# Patient Record
Sex: MALE | Race: WHITE
[De-identification: names, ages, dates, MRNs, and addresses within clinical notes are randomized per-mention and may not be internally consistent; named-entity substitution may affect disease eponyms.]

---

## 2018-04-24 ENCOUNTER — HOSPITAL ENCOUNTER (OUTPATIENT)
Dept: HOSPITAL 42 - ED | Age: 43
Setting detail: OBSERVATION
LOS: 2 days | Discharge: HOME | End: 2018-04-26
Attending: HOSPITALIST | Admitting: INTERNAL MEDICINE
Payer: COMMERCIAL

## 2018-04-24 VITALS — BODY MASS INDEX: 40.3 KG/M2

## 2018-04-24 DIAGNOSIS — G47.30: ICD-10-CM

## 2018-04-24 DIAGNOSIS — F17.210: ICD-10-CM

## 2018-04-24 DIAGNOSIS — Z91.14: ICD-10-CM

## 2018-04-24 DIAGNOSIS — Z86.011: ICD-10-CM

## 2018-04-24 DIAGNOSIS — E78.1: ICD-10-CM

## 2018-04-24 DIAGNOSIS — Z88.8: ICD-10-CM

## 2018-04-24 DIAGNOSIS — Z82.49: ICD-10-CM

## 2018-04-24 DIAGNOSIS — E78.5: ICD-10-CM

## 2018-04-24 DIAGNOSIS — J45.909: ICD-10-CM

## 2018-04-24 DIAGNOSIS — R47.01: ICD-10-CM

## 2018-04-24 DIAGNOSIS — G45.9: Primary | ICD-10-CM

## 2018-04-24 DIAGNOSIS — R07.89: ICD-10-CM

## 2018-04-24 DIAGNOSIS — F41.0: ICD-10-CM

## 2018-04-24 DIAGNOSIS — E66.9: ICD-10-CM

## 2018-04-24 DIAGNOSIS — E78.00: ICD-10-CM

## 2018-04-24 DIAGNOSIS — I10: ICD-10-CM

## 2018-04-24 DIAGNOSIS — G43.809: ICD-10-CM

## 2018-04-24 LAB
ALBUMIN SERPL-MCNC: 4.5 G/DL (ref 3–4.8)
ALBUMIN/GLOB SERPL: 1.4 {RATIO} (ref 1.1–1.8)
ALT SERPL-CCNC: 80 U/L (ref 7–56)
APPEARANCE UR: CLEAR
APTT BLD: 36.8 SECONDS (ref 25.1–36.5)
AST SERPL-CCNC: 54 U/L (ref 17–59)
BILIRUB UR-MCNC: NEGATIVE MG/DL
BUN SERPL-MCNC: 12 MG/DL (ref 7–21)
CALCIUM SERPL-MCNC: 9.3 MG/DL (ref 8.4–10.5)
COLOR UR: YELLOW
ERYTHROCYTE [DISTWIDTH] IN BLOOD BY AUTOMATED COUNT: 12.9 % (ref 11.5–14.5)
GFR NON-AFRICAN AMERICAN: > 60
GLUCOSE UR STRIP-MCNC: NEGATIVE MG/DL
HGB BLD-MCNC: 15.4 G/DL (ref 14–18)
INR PPP: 1.07 (ref 0.93–1.08)
LEUKOCYTE ESTERASE UR-ACNC: NEGATIVE LEU/UL
MCH RBC QN AUTO: 31.6 PG (ref 25–35)
MCHC RBC AUTO-ENTMCNC: 36.3 G/DL (ref 31–37)
MCV RBC AUTO: 86.9 FL (ref 80–105)
PH UR STRIP: 6.5 [PH] (ref 4.7–8)
PLATELET # BLD: 180 10^3/UL (ref 120–450)
PMV BLD AUTO: 10.5 FL (ref 7–11)
PROT UR STRIP-MCNC: NEGATIVE MG/DL
PROTHROMBIN TIME: 12.2 SECONDS (ref 9.4–12.5)
RBC # BLD AUTO: 4.88 10^6/UL (ref 3.5–6.1)
RBC # UR STRIP: NEGATIVE /UL
SP GR UR STRIP: 1.01 (ref 1–1.03)
TROPONIN I SERPL-MCNC: < 0.01 NG/ML
UROBILINOGEN UR STRIP-ACNC: 0.2 E.U./DL
WBC # BLD AUTO: 6.6 10^3/UL (ref 4.5–11)

## 2018-04-24 PROCEDURE — 70498 CT ANGIOGRAPHY NECK: CPT

## 2018-04-24 PROCEDURE — 99285 EMERGENCY DEPT VISIT HI MDM: CPT

## 2018-04-24 PROCEDURE — 93306 TTE W/DOPPLER COMPLETE: CPT

## 2018-04-24 PROCEDURE — 70496 CT ANGIOGRAPHY HEAD: CPT

## 2018-04-24 PROCEDURE — 96365 THER/PROPH/DIAG IV INF INIT: CPT

## 2018-04-24 PROCEDURE — 83615 LACTATE (LD) (LDH) ENZYME: CPT

## 2018-04-24 PROCEDURE — 96375 TX/PRO/DX INJ NEW DRUG ADDON: CPT

## 2018-04-24 PROCEDURE — 80061 LIPID PANEL: CPT

## 2018-04-24 PROCEDURE — 93005 ELECTROCARDIOGRAM TRACING: CPT

## 2018-04-24 PROCEDURE — 85610 PROTHROMBIN TIME: CPT

## 2018-04-24 PROCEDURE — 84443 ASSAY THYROID STIM HORMONE: CPT

## 2018-04-24 PROCEDURE — 97116 GAIT TRAINING THERAPY: CPT

## 2018-04-24 PROCEDURE — 92610 EVALUATE SWALLOWING FUNCTION: CPT

## 2018-04-24 PROCEDURE — 81003 URINALYSIS AUTO W/O SCOPE: CPT

## 2018-04-24 PROCEDURE — 86900 BLOOD TYPING SEROLOGIC ABO: CPT

## 2018-04-24 PROCEDURE — 86850 RBC ANTIBODY SCREEN: CPT

## 2018-04-24 PROCEDURE — 96372 THER/PROPH/DIAG INJ SC/IM: CPT

## 2018-04-24 PROCEDURE — 36415 COLL VENOUS BLD VENIPUNCTURE: CPT

## 2018-04-24 PROCEDURE — 97161 PT EVAL LOW COMPLEX 20 MIN: CPT

## 2018-04-24 PROCEDURE — 70553 MRI BRAIN STEM W/O & W/DYE: CPT

## 2018-04-24 PROCEDURE — 84439 ASSAY OF FREE THYROXINE: CPT

## 2018-04-24 PROCEDURE — 84484 ASSAY OF TROPONIN QUANT: CPT

## 2018-04-24 PROCEDURE — 93880 EXTRACRANIAL BILAT STUDY: CPT

## 2018-04-24 PROCEDURE — 70450 CT HEAD/BRAIN W/O DYE: CPT

## 2018-04-24 PROCEDURE — 80053 COMPREHEN METABOLIC PANEL: CPT

## 2018-04-24 PROCEDURE — 85027 COMPLETE CBC AUTOMATED: CPT

## 2018-04-24 PROCEDURE — 83036 HEMOGLOBIN GLYCOSYLATED A1C: CPT

## 2018-04-24 PROCEDURE — 85025 COMPLETE CBC W/AUTO DIFF WBC: CPT

## 2018-04-24 PROCEDURE — 82550 ASSAY OF CK (CPK): CPT

## 2018-04-24 PROCEDURE — 85730 THROMBOPLASTIN TIME PARTIAL: CPT

## 2018-04-25 VITALS — RESPIRATION RATE: 20 BRPM

## 2018-04-25 LAB
ALBUMIN SERPL-MCNC: 4.6 G/DL (ref 3–4.8)
ALBUMIN/GLOB SERPL: 1.5 {RATIO} (ref 1.1–1.8)
ALT SERPL-CCNC: 78 U/L (ref 7–56)
AST SERPL-CCNC: 48 U/L (ref 17–59)
BASOPHILS # BLD AUTO: 0.02 K/MM3 (ref 0–2)
BASOPHILS NFR BLD: 0.2 % (ref 0–3)
BUN SERPL-MCNC: 12 MG/DL (ref 7–21)
CALCIUM SERPL-MCNC: 10.4 MG/DL (ref 8.4–10.5)
EOSINOPHIL # BLD: 0.2 10*3/UL (ref 0–0.7)
EOSINOPHIL NFR BLD: 2.3 % (ref 1.5–5)
ERYTHROCYTE [DISTWIDTH] IN BLOOD BY AUTOMATED COUNT: 13.1 % (ref 11.5–14.5)
GFR NON-AFRICAN AMERICAN: > 60
GRANULOCYTES # BLD: 5.88 10*3/UL (ref 1.4–6.5)
GRANULOCYTES NFR BLD: 62.2 % (ref 50–68)
HDLC SERPL-MCNC: 31 MG/DL (ref 29–60)
HGB BLD-MCNC: 16 G/DL (ref 14–18)
LDLC SERPL-MCNC: 138 MG/DL (ref 0–129)
LYMPHOCYTES # BLD: 2.7 10*3/UL (ref 1.2–3.4)
LYMPHOCYTES NFR BLD AUTO: 28.4 % (ref 22–35)
MCH RBC QN AUTO: 31.4 PG (ref 25–35)
MCHC RBC AUTO-ENTMCNC: 35.9 G/DL (ref 31–37)
MCV RBC AUTO: 87.6 FL (ref 80–105)
MONOCYTES # BLD AUTO: 0.7 10*3/UL (ref 0.1–0.6)
MONOCYTES NFR BLD: 6.9 % (ref 1–6)
PLATELET # BLD: 203 10^3/UL (ref 120–450)
PMV BLD AUTO: 10.4 FL (ref 7–11)
RBC # BLD AUTO: 5.09 10^6/UL (ref 3.5–6.1)
T4 FREE SERPL-MCNC: 1.16 NG/DL (ref 0.78–2.19)
WBC # BLD AUTO: 9.5 10^3/UL (ref 4.5–11)

## 2018-04-25 RX ADMIN — ENOXAPARIN SODIUM SCH MG: 40 INJECTION SUBCUTANEOUS at 10:53

## 2018-04-25 RX ADMIN — PANTOPRAZOLE SODIUM SCH MG: 40 TABLET, DELAYED RELEASE ORAL at 06:47

## 2018-04-25 NOTE — CARD
--------------- APPROVED REPORT --------------





EKG Measurement

Heart [id number]

VT [id number]

[id number]

[id number]

[id number]



<Conclusion>

Sinus tachycardia

Possible Left atrial enlargement

## 2018-04-25 NOTE — CT
PROCEDURE:  CT HEAD WITHOUT CONTRAST.



HISTORY:

WEAKNESS/POSSIBLE CVA



COMPARISON:

11/20/14 



TECHNIQUE:

Axial computed tomography images were obtained through the head/brain 

without intravenous contrast.  



Radiation dose:



Total exam DLP =  mGy-cm.



This CT exam was performed using one or more of the following dose 

reduction techniques: Automated exposure control, adjustment of the 

mA and/or kV according to patient size, and/or use of iterative 

reconstruction technique.



FINDINGS:



HEMORRHAGE:

No intracranial hemorrhage. 



BRAIN:

No mass effect or edema.  No atrophy or chronic microvascular 

ischemic changes.



VENTRICLES:

Unremarkable. No hydrocephalus. 



CALVARIUM:

Unremarkable.



PARANASAL SINUSES:

Unremarkable as visualized. No significant inflammatory changes.



MASTOID AIR CELLS:

Unremarkable as visualized. No inflammatory changes.



OTHER FINDINGS:

None.



IMPRESSION:

Normal CT of the Head.

## 2018-04-25 NOTE — CP.PCM.PN
<Rusty Mclaughlin - Last Filed: 04/25/18 12:29>





Subjective





- Date & Time of Evaluation


Date of Evaluation: 04/25/18


Time of Evaluation: 08:50





- Subjective


Subjective: 





Subjective:


Patient seen and examined at bedside. Resting comfortably in bed. No acute 

overnight events. Patient states admitting symptoms have resolved. Offers no 

new complaints at this time. Denies fever, chills, chest pain, shortness of 

breath, abdominal pain, nausea, vomiting, diarrhea, constipation, and urinary 

symptoms.





12-point review of systems negative except as indicated in the HPI





Physical Examination:


- Constitutional


Appears: Well, Non-toxic, No Acute Distress





- Head Exam


Head Exam: ATRAUMATIC, NORMAL INSPECTION, NORMOCEPHALIC





- Eye Exam


Eye Exam: EOMI, Normal appearance, PERRL.  absent: Scleral icterus





- ENT Exam


ENT Exam: Mucous Membranes Moist, Normal Oropharynx





- Neck Exam


Neck exam: Negative for: Lymphadenopathy, Thyromegaly





- Respiratory Exam


Respiratory Exam: Clear to Auscultation Bilateral, NORMAL BREATHING PATTERN





- Cardiovascular Exam


Cardiovascular Exam: RRR, +S1, +S2.  absent: JVD





- GI/Abdominal Exam


GI & Abdominal Exam: Normal Bowel Sounds, Soft.  absent: Organomegaly, 

Tenderness





- Extremities Exam


Extremities exam: Positive for: normal capillary refill, normal inspection, 

pedal pulses present.  Negative for: pedal edema





- Neurological Exam


Neurological exam: Patient is awake, alert, responds to verbal stimuli, answers 

questions appropriately, follows commands, and moves extremities past midline





- Psychiatric Exam


Psychiatric exam: Anxious





- Skin


Skin Exam: Dry, Intact, Normal Color, Warm





Assessment and Plan:


Patient is a 42 year old male with a PMHx of HLD, HTN, Anxiety, unspecified 

benign brain tumor (possible pituitary adenoma), and medication non-compliance 

who was admitted for evaluation and treatment of TIA.





TIA


- Neurology Consulted (Dr. Tenorio)- appreciate recommendations


- ECHO and Carotid Dopplers ordered and pending


- head and neck CTA ordered and pending


- HgBA1C: 5.7


- Trop I - NEGATIVE 


- Lipid Panel: Triglycerides - 515 | Cholesterol - 227 | LDL - 138 | HDL - 31- 

starting patient on liptor 40mg


- TSH/Free T4 - Within Normal Limits


- Low Fall Protocol


- Neuro Checks Q2H for 24 hours


- Seizure Precautions


- ASA/Plavix Daily


- started lipitor 40mg PO daily





Hx of HTN


- blood pressure reviewed, trended, and appreciated- 140s/90s


- Home Enalapril not in formulary


- Start Lisinopril 10 PO Daily





Hx of HLD


- started patient on lipitor 40mg PO daily


- Home Gemfibrozil 600mg PO Daily





Hx of unspecified tumor (Possibly pituitary adenoma)


- head CT 4/24/18- Normal CT of the Head





Prophylaxis


- DVT ppx Lovenox


- GI ppx Protonix 





Patient seen, case discussed with, and plan approved by attending physician, 

Dr. Peralta














Objective





- Vital Signs/Intake and Output


Vital Signs (last 24 hours): 


 











Temp Pulse Resp BP Pulse Ox


 


 98.2 F   78   19   147/97 H  99 


 


 04/25/18 11:10  04/25/18 11:10  04/25/18 11:10  04/25/18 11:10  04/25/18 11:00











- Medications


Medications: 


 Current Medications





Albuterol/Ipratropium (Duoneb 3 Mg/0.5 Mg (3 Ml) Ud)  3 ml IH Q4H PRN


   PRN Reason: Shortness of Breath


Aspirin (Aspirin Chewable)  81 mg PO DAILY [facility]


   Last Admin: 04/25/18 10:52 Dose:  81 mg


Clopidogrel Bisulfate (Plavix)  75 mg PO DAILY [facility]


   Last Admin: 04/25/18 10:53 Dose:  75 mg


Enoxaparin Sodium (Lovenox)  40 mg SC DAILY [facility]


   PRN Reason: Protocol


   Last Admin: 04/25/18 10:53 Dose:  40 mg


Gemfibrozil (Lopid)  600 mg PO DAILY [facility]


Ibuprofen (Motrin Tab)  600 mg PO Q6H PRN


   PRN Reason: Pain, Moderate - Severe (4-10)


   Last Admin: 04/25/18 03:12 Dose:  600 mg


Lisinopril (Zestril)  10 mg PO DAILY [facility]


   Last Admin: 04/25/18 10:53 Dose:  10 mg


Pantoprazole Sodium (Protonix Ec Tab)  40 mg PO 0600 [facility]


   Last Admin: 04/25/18 06:47 Dose:  40 mg











- Labs


Labs: 


 





 04/25/18 05:00 





 04/25/18 05:00 





 











PT  12.2 SECONDS (9.4-12.5)   04/24/18  20:49    


 


INR  1.07  (0.93-1.08)   04/24/18  20:49    


 


APTT  36.8 Seconds (25.1-36.5)  H  04/24/18  20:49    














<Ashanti Ladd - Last Filed: 04/26/18 17:02>





Objective





- Vital Signs/Intake and Output


Vital Signs (last 24 hours): 


 











Temp Pulse Resp BP Pulse Ox


 


 97.8 F   89   20   122/84   96 


 


 04/26/18 08:25  04/26/18 10:08  04/26/18 08:25  04/26/18 10:08  04/26/18 08:25








Intake and Output: 


 











 04/26/18 04/26/18





 06:59 18:59


 


Intake Total 1430 420


 


Output Total 700 


 


Balance 730 420














- Medications


Medications: 


 Current Medications





Albuterol/Ipratropium (Duoneb 3 Mg/0.5 Mg (3 Ml) Ud)  3 ml IH Q4H PRN


   PRN Reason: Shortness of Breath


Aspirin (Aspirin Chewable)  81 mg PO DAILY [facility]


   Last Admin: 04/26/18 10:08 Dose:  81 mg


Atorvastatin Calcium (Lipitor)  40 mg PO DIN [facility]


   Last Admin: 04/26/18 16:51 Dose:  40 mg


Clopidogrel Bisulfate (Plavix)  75 mg PO DAILY [facility]


   Last Admin: 04/26/18 10:08 Dose:  75 mg


Enoxaparin Sodium (Lovenox)  40 mg SC DAILY [facility]


   PRN Reason: Protocol


   Last Admin: 04/26/18 11:31 Dose:  40 mg


Ibuprofen (Motrin Tab)  600 mg PO Q6H PRN


   PRN Reason: Pain, Moderate - Severe (4-10)


   Last Admin: 04/26/18 02:33 Dose:  600 mg


Lisinopril (Zestril)  10 mg PO DAILY [facility]


   Last Admin: 04/26/18 10:08 Dose:  10 mg


Pantoprazole Sodium (Protonix Ec Tab)  40 mg PO 0600 [facility]


   Last Admin: 04/26/18 05:21 Dose:  40 mg











- Labs


Labs: 


 





 04/26/18 06:45 





 











PT  12.2 SECONDS (9.4-12.5)   04/24/18  20:49    


 


INR  1.07  (0.93-1.08)   04/24/18  20:49    


 


APTT  36.8 Seconds (25.1-36.5)  H  04/24/18  20:49    














Attending/Attestation





- Attestation


I have personally seen and examined this patient.: Yes


I have fully participated in the care of the patient.: Yes


I have reviewed all pertinent clinical information, including history, physical 

exam and plan: Yes


Notes (Text): 


I have seen and examined the patient at bedside. Agree with the above note with 

the following additions/ exceptions: Briefly this is 42 year old male with 

history of HTN, dyslipidemia, anxiety and medication non compliance who was 

admitted for TIA. CT head negative. Work up pending. Resume home medications. 

Upon discharge patient will follow up with Dr Cosme Garcia.

## 2018-04-25 NOTE — CT
PROCEDURE:  CT Angiography of the Brain.



HISTORY:

WEAKNESS/POSSIBLE CVA



COMPARISON:

None available. 



TECHNIQUE:

CT angiography of the intracranial and cervical arteries was 

performed. Coronal and sagittal maximum intensity projection 

reformatted images were generated.



Contrast Dose: Omnipaque 350, 100 cc



Radiation dose:Total exam DLP = 773.82 mGy-cm.



This CT exam was performed using one or more of the following dose 

reduction techniques: Automated exposure control, adjustment of the 

mA and/or kV according to patient size, and/or use of iterative 

reconstruction technique.



FINDINGS:

Study is somewhat limited in overall arterial enhancement venous 

enhancement greater than arterial overall. 



INTERNAL CEREBRAL ARTERIES:

The skull base, petrous, cavernous and supraclinoid segments are 

bilaterally appear patent. 



ANTERIOR CEREBRAL ARTERIES:

A1 and A2 segments are patent. Smaller distal branches unremarkable, 

as visualized.



MIDDLE CEREBRAL ARTERIES:

M1 and M2 segments are widely patent. Perisylvian branches grossly 

symmetric.



POSTERIOR CIRCULATION:

Basilar Artery: Patent.



Distal Vertebral Arteries: Patent.



Posterior Cerebral Arteries: Patent.



Posterior Inferior Cerebellar Arteries: Unremarkable.



NECK CTA:



Common Carotid arteries: The bilateral common carotid appear patent 

from their origins to their bifurcations with no significant stenosis 

appreciated. 



Internal Carotid arteries: No significant stenosis is appreciated 

throughout the cervical internal carotid artery segments bilaterally.



External Carotid arteries: Appear unremarkable bilaterally. 



Vertebral arteries: The bilateral vertebral arteries appear patent 

from their origins to their junction with the basilar artery. No 

significant stenosis or definite pattern of dissection.  



ANEURYSM/ VASCULAR MALFORMATIONS:

None.



OTHER FINDINGS:

None. 



IMPRESSION:

Limited CT Angiography of the Brain and neck as discussed above. The 

bilateral common and internal carotid arteries appear patent without 

gross stenosis and central [place] Sloan anatomy is patent as well. 

Arterial contrast-enhancement overall is somewhat limited.



Findings discussed with Dr. Garcia 04/20/2018 10:30 p.m. with 

written down and read back verification.

## 2018-04-25 NOTE — US
PROCEDURE:  Bilateral carotid artery duplex ultrasound 



HISTORY:

Carotid stenosis TIA



PHYSICIAN(S):  Nando Mcknight MD.



TECHNIQUE:

Duplex sonography and color-flow Doppler were used to evaluate the 

carotid bifurcations and limited segments of the vertebral arteries 

bilaterally.



FINDINGS:

There is mild intimal- medial thickening noted at the carotid 

bifurcations bilaterally. The peak systolic velocity in the proximal 

right internal carotid artery is 69 cm/sec. This corresponds to a 

0-19 percent proximal right ICA stenosis. Normal systolic velocities 

are noted in the proximal right external carotid artery. There is 

antegrade flow in the small right vertebral artery.



The peak systolic velocity in the proximal left internal carotid 

artery is 59 cm/sec. This corresponds to a 0-19 percent proximal left 

ICA stenosis. Normal systolic velocities are noted in the proximal 

left external carotid artery. There is antegrade flow in the dominant 

left vertebral artery.



IMPRESSION:

1. Bilateral 0-19 percent proximal ICA stenoses.



2. Antegrade flow in both vertebral arteries.

## 2018-04-25 NOTE — CP.PCM.HP
<Irma Anderson - Last Filed: 04/25/18 05:54>





History of Present Illness





- History of Present Illness


History of Present Illness: 





This patient is a 42 year old with a PMHx of HLD, HTN, Anxiety, unspecified 

benign brain tumor (possible pituitary adenoma), and medication non-compliance 

who presents with what seems to be described as TIA symptoms. Patient states 

that around 7pm on 4/25/2018 he had a sudden onset of tightness from the neck, 

up. He states it felt like he had an "outer body experience" and like had been 

drinking. He also describes the feeling as if 100lbs was sitting at the top of 

his head.  During this episode he states he was unable to talk and then when he 

gained his speech back, it was slurred.  His speech deficit lasted for less 

than 5 minutes.  He took 2 81mg aspirins, vitamins, and his anti-hypertensive 

medications and then called the ambulance himself. Patient is somewhat of a 

poor historian.  Of note, patient states that for the past 7-8 years and 2-3x 

per year patient experiences episodes of sharp chest pain that resolves with 

aspirin.  Today, patient denied any chest pain, palpitations, vision changes, 

headache, fever, chills, abdominal pain, nausea, vomiting, changes in bowel 

habits or urinary symptoms. Patient did admit to mild dizziness.  





ROS: As stated above.    





PMHx: HLD, HTN, Anxiety, unspecified benign brain tumor (possible pituitary 

adenoma), and medication non-compliance


PSHx: Denies


Allergies: Atenolol


SocialHx: 1.5PPD for 31 years. Denies Alcohol or illicit drug use.  Lives at 

home, unemployed, takes care of disabled child and wife


Hos: Denies


FamHx: Mother - Hypertension


Meds: Reviewed




















Present on Admission





- Present on Admission


Any Indicators Present on Admission: No





Review of Systems





- Review of Systems


All systems: reviewed and no additional remarkable complaints except (As per HPI

)


Review of Systems: 





As per HPI








Past Patient History





- Tetanus Immunizations


Tetanus Immunization: Unknown





- Past Social History


Smoking Status: Heavy Smoker > 10 Cigarettes Daily





- CARDIAC


Hx Hypertension: Yes





- PULMONARY


Hx Asthma: Yes


Hx Sleep Apnea: Yes





- NEUROLOGICAL


Hx Migraine: Yes


Hx Syncope: Yes





- MUSCULOSKELETAL/RHEUMATOLOGICAL


Hx Musculoskeletal Disorders: No





- GASTROINTESTINAL


Hx Gastrointestinal Disorders: No





- GENITOURINARY/GYNECOLOGICAL


Hx Genitourinary Disorders: No





- PSYCHIATRIC


Hx Psychophysiologic Disorder: No


Hx Substance Use: No





Meds


Allergies/Adverse Reactions: 


 Allergies











Allergy/AdvReac Type Severity Reaction Status Date / Time


 


atenolol Allergy  SWELLING Verified 04/24/18 22:31














Physical Exam





- Constitutional


Appears: Well, Non-toxic, No Acute Distress





- Head Exam


Head Exam: ATRAUMATIC, NORMAL INSPECTION, NORMOCEPHALIC





- Eye Exam


Eye Exam: EOMI, Normal appearance, PERRL.  absent: Scleral icterus





- ENT Exam


ENT Exam: Mucous Membranes Moist, Normal Oropharynx





- Neck Exam


Neck exam: Negative for: Lymphadenopathy, Thyromegaly





- Respiratory Exam


Respiratory Exam: Clear to Auscultation Bilateral, NORMAL BREATHING PATTERN





- Cardiovascular Exam


Cardiovascular Exam: RRR, +S1, +S2.  absent: JVD





- GI/Abdominal Exam


GI & Abdominal Exam: Normal Bowel Sounds, Soft.  absent: Organomegaly, 

Tenderness





- Extremities Exam


Extremities exam: Positive for: normal capillary refill, normal inspection, 

pedal pulses present.  Negative for: pedal edema





- Neurological Exam


Neurological exam: Alert, CN II-XII Intact, Normal Gait, Oriented x3, Reflexes 

Normal


Additional comments: 


No motor or sensory deficit. 








- Psychiatric Exam


Psychiatric exam: Anxious





- Skin


Skin Exam: Dry, Intact, Normal Color, Warm





Results





- Labs


Result Diagrams: 


 04/24/18 20:49





 04/24/18 20:49


Labs: 





 Laboratory Results - last 24 hr











  04/24/18 04/24/18 04/24/18





  20:49 20:49 20:49


 


WBC  6.6  


 


RBC  4.88  


 


Hgb  15.4  


 


Hct  42.4  


 


MCV  86.9  


 


MCH  31.6  


 


MCHC  36.3  


 


RDW  12.9  


 


Plt Count  180  


 


MPV  10.5  


 


PT    12.2


 


INR    1.07


 


APTT    36.8 H


 


Sodium   142 


 


Potassium   3.6 


 


Chloride   103 


 


Carbon Dioxide   25 


 


Anion Gap   18 


 


BUN   12 


 


Creatinine   0.7 L 


 


Est GFR ( Amer)   > 60 


 


Est GFR (Non-Af Amer)   > 60 


 


Random Glucose   142 H 


 


Calcium   9.3 


 


Total Bilirubin   0.3 


 


AST   54 


 


ALT   80 H 


 


Alkaline Phosphatase   67 


 


Lactate Dehydrogenase   582 


 


Total Creatine Kinase   155 


 


Troponin I   < 0.01 


 


Total Protein   7.7 


 


Albumin   4.5 


 


Globulin   3.2 


 


Albumin/Globulin Ratio   1.4 


 


Urine Color   


 


Urine Appearance   


 


Urine pH   


 


Ur Specific Gravity   


 


Urine Protein   


 


Urine Glucose (UA)   


 


Urine Ketones   


 


Urine Blood   


 


Urine Nitrate   


 


Urine Bilirubin   


 


Urine Urobilinogen   


 


Ur Leukocyte Esterase   














  04/24/18





  20:49


 


WBC 


 


RBC 


 


Hgb 


 


Hct 


 


MCV 


 


MCH 


 


MCHC 


 


RDW 


 


Plt Count 


 


MPV 


 


PT 


 


INR 


 


APTT 


 


Sodium 


 


Potassium 


 


Chloride 


 


Carbon Dioxide 


 


Anion Gap 


 


BUN 


 


Creatinine 


 


Est GFR ( Amer) 


 


Est GFR (Non-Af Amer) 


 


Random Glucose 


 


Calcium 


 


Total Bilirubin 


 


AST 


 


ALT 


 


Alkaline Phosphatase 


 


Lactate Dehydrogenase 


 


Total Creatine Kinase 


 


Troponin I 


 


Total Protein 


 


Albumin 


 


Globulin 


 


Albumin/Globulin Ratio 


 


Urine Color  Yellow


 


Urine Appearance  Clear


 


Urine pH  6.5


 


Ur Specific Gravity  1.010


 


Urine Protein  Negative


 


Urine Glucose (UA)  Negative


 


Urine Ketones  Negative


 


Urine Blood  Negative


 


Urine Nitrate  Negative


 


Urine Bilirubin  Negative


 


Urine Urobilinogen  0.2


 


Ur Leukocyte Esterase  Negative














Assessment & Plan





- Assessment and Plan (Free Text)


Assessment: 


42 year old with a PMHx of HLD, HTN, Anxiety, unspecified benign brain tumor (

possible pituitary adenoma), and medication non-compliance admitted for 

evaluation and treatment of TIA 





Plan: 


TIA


Code Stroke Called in the E.D during EMR Downtime. NIHSS stroke scale in ED 0 

per Dr. Powell at 20:32. CT of the head was negative.  Neurology consulted by 

E.D physician and recommended aspirin and plavix which was given.  Patient also 

got IV labetolol in ED.





Neurology Consulted (Dr. Tenorio)


ECHO | Carotid Dopplers


HgBA1C: PENDING


Trop I - NEGATIVE 


Lipid Panel: Triglycerides - 515 | Cholesterol - 227 | LDL - 138 | HDL - 31


TSH/Free T4 - Within Normal Limits


Low Fall Protocol


Neuro Checks Q2H for 24 hours


Seizure Precautions


Swallow Eval


ASA/Plavix Daily


Stat Lipitor 





Hx of HTN


Home Enalapril not in formulary


Start Lisinopril 10 PO Daily





Hx of HLD


Home Gemfibrozil 600mg PO Daily





Hx of unspecified tumor (Possibly pituitary adenoma)


Patient has visit in EMR for Benign Neoplasm


Head MRI/CT taken in the past shows no evidence of tumor. Patient's PMD should 

be called in AM.





Proph


Lovenox


Protonix 





Patient seen and discussed with Attending


Irma Anderson, PGY-1



































- Date & Time


Date: 04/24/18


Time: 11:50





NIHSS Scale ([place])


Time Performed: 11:50





- How Severe is the Stoke


  ** Baseline


Level of Consciousness: 0=Alert


LOC to Questions: 0=Both comments correct


LOC to commands: 0=Obeys both correctly


Best Gaze: 0=Normal


Visual: 0=No visual loss


Facial: 0=Normal


Motor Arm - Left: 0=No drift


Motor Arm - Right: 0=No drift


Motor Leg - Left: 0=No drift


Motor Leg - Right: 0=No drift


Limb Ataxia: 0=Absent


Sensory: 0=Normal


Best Language: 0=No aphasia


Dysarthia: 0=Normal articulation


Extinction & Inattention (Neglect): 0=Normal, no object


Score: 0


Risk Level: No Stroke Risk





<Ally Gonzales - Last Filed: 04/25/18 06:49>





Results





- Vital Signs


Recent Vital Signs: 





 Last Vital Signs











Temp      


 


Pulse  89   04/25/18 03:00


 


Resp  18   04/25/18 03:00


 


BP  132/90   04/25/18 03:00


 


Pulse Ox  100   04/25/18 03:00














- Labs


Result Diagrams: 


 04/25/18 05:00





 04/25/18 05:00


Labs: 





 Laboratory Results - last 24 hr











  04/25/18 04/25/18





  05:00 05:00


 


WBC  9.5  D 


 


RBC  5.09 


 


Hgb  16.0 


 


Hct  44.6 


 


MCV  87.6 


 


MCH  31.4 


 


MCHC  35.9 


 


RDW  13.1 


 


Plt Count  203 


 


MPV  10.4 


 


Gran %  62.2 


 


Lymph % (Auto)  28.4 


 


Mono % (Auto)  6.9 H 


 


Eos % (Auto)  2.3 


 


Baso % (Auto)  0.2 


 


Gran #  5.88 


 


Lymph # (Auto)  2.7 


 


Mono # (Auto)  0.7 H 


 


Eos # (Auto)  0.2 


 


Baso # (Auto)  0.02 


 


Sodium   144


 


Potassium   3.9


 


Chloride   104


 


Carbon Dioxide   25


 


Anion Gap   19


 


BUN   12


 


Creatinine   0.8


 


Est GFR ( Amer)   > 60


 


Est GFR (Non-Af Amer)   > 60


 


Random Glucose   94


 


Calcium   10.4


 


Total Bilirubin   0.4


 


AST   48


 


ALT   78 H


 


Alkaline Phosphatase   77


 


Total Protein   7.7


 


Albumin   4.6


 


Globulin   3.1


 


Albumin/Globulin Ratio   1.5














Attending/Attestation





- Attestation


I have personally seen and examined this patient.: Yes


I have fully participated in the care of the patient.: Yes


I have reviewed all pertinent clinical information: Yes


Notes (Text): 





04/25/18 06:49


Agree with documentation and orders placed

## 2018-04-25 NOTE — CP.PCM.CON
<Silvio Pierce - Last Filed: 04/25/18 16:06>





History of Present Illness





- History of Present Illness


History of Present Illness: 





Neurology Consult Note - Dr. Tenorio





CC: Difficulty speaking 





HPI: 42 M with a PMHx of sleep apnea, HTN, HLD, asthma, tobacco dependency, and 

medication non-compliance presented to the [facility] [place] ED with complaints of left 

facial droop and difficulty speaking. Pt stated that the evening of 4/24/18, he 

experienced a sudden onset of neck and head pressure/tightness and "out of body

" sensation. He subsequently drank coffee with sugar thinking he had low blood 

sugar, and followed by a cigarrette. He then returned home still feeling unlike 

himself and while talking to his significant other noticed it was hard to speak 

and his spouse noted facial asymmetry (left facial droop) and slurred speech 

prompting him to seek medical attention.  Pt also noted facial and perioral 

numbness and tingling predominately on the left side.Pt states that the 

duration of the episode was roughly 3-5 minutes. Pt took an extra dose of his 

aspirin followed by his regular medications, and called the ambulance. Of note, 

he takes his medications once every "couple of weeks". Pt Pt was seen and 

examined at bedside. patient denied any chest pain, palpitations, vision changes

, fever, chills, abdominal pain, nausea, vomiting, changes in bowel habits or 

urinary symptoms.





 


PMHx: HLD, HTN, Anxiety, unspecified benign brain tumor (possible pituitary 

adenoma), and medication non-compliance


PSHx: Denies


SHx: [id number] years. Denies Alcohol or illicit drug use.  


FamHx: HTN


Meds: noncompliant


Allergies: Atenolol





Review of Systems





- Review of Systems


Review of Systems: 





as per HPI otherwise negative





Past Patient History





- Tetanus Immunizations


Tetanus Immunization: Unknown





- Past Social History


Smoking Status: Heavy Smoker > 10 Cigarettes Daily





- CARDIAC


Hx Hypertension: Yes





- PULMONARY


Hx Asthma: Yes


Hx Sleep Apnea: Yes





- NEUROLOGICAL


Hx Migraine: Yes





- MUSCULOSKELETAL/RHEUMATOLOGICAL


Hx Musculoskeletal Disorders: No


Hx Falls: Yes





- GASTROINTESTINAL


Hx Gastrointestinal Disorders: No





- GENITOURINARY/GYNECOLOGICAL


Hx Genitourinary Disorders: No





- PSYCHIATRIC


Hx Psychophysiologic Disorder: No


Hx Substance Use: No





Meds


Allergies/Adverse Reactions: 


 Allergies











Allergy/AdvReac Type Severity Reaction Status Date / Time


 


atenolol Allergy  SWELLING Verified 04/24/18 22:31














- Medications


Medications: 


 Current Medications





Albuterol/Ipratropium (Duoneb 3 Mg/0.5 Mg (3 Ml) Ud)  3 ml IH Q4H PRN


   PRN Reason: Shortness of Breath


Aspirin (Aspirin Chewable)  81 mg PO DAILY [facility]


   Last Admin: 04/25/18 10:52 Dose:  81 mg


Clopidogrel Bisulfate (Plavix)  75 mg PO DAILY [facility]


   Last Admin: 04/25/18 10:53 Dose:  75 mg


Enoxaparin Sodium (Lovenox)  40 mg SC DAILY [facility]


   PRN Reason: Protocol


   Last Admin: 04/25/18 10:53 Dose:  40 mg


Gemfibrozil (Lopid)  600 mg PO DAILY [facility]


Ibuprofen (Motrin Tab)  600 mg PO Q6H PRN


   PRN Reason: Pain, Moderate - Severe (4-10)


   Last Admin: 04/25/18 03:12 Dose:  600 mg


Lisinopril (Zestril)  10 mg PO DAILY [facility]


   Last Admin: 04/25/18 10:53 Dose:  10 mg


Pantoprazole Sodium (Protonix Ec Tab)  40 mg PO 0600 [facility]


   Last Admin: 04/25/18 06:47 Dose:  40 mg











Physical Exam





- Constitutional


Appears: No Acute Distress





- Head Exam


Head Exam: ATRAUMATIC, NORMAL INSPECTION, NORMOCEPHALIC





- Eye Exam


Eye Exam: EOMI, Normal appearance, PERRL


Pupil Exam: NORMAL ACCOMODATION, PERRL





- ENT Exam


ENT Exam: Mucous Membranes Moist, Normal Exam





- Neck Exam


Neck exam: Positive for: Normal Inspection





- Respiratory Exam


Respiratory Exam: Clear to Auscultation Bilateral, NORMAL BREATHING PATTERN





- Cardiovascular Exam


Cardiovascular Exam: REGULAR RHYTHM, +S1, +S2





- GI/Abdominal Exam


GI & Abdominal Exam: Normal Bowel Sounds, Soft.  absent: Tenderness





- Extremities Exam


Extremities exam: Positive for: normal inspection





- Neurological Exam


Neurological exam: Alert, CN II-XII Intact, Normal Gait, Oriented x3, Reflexes 

Normal





- Expanded Neurological Exam


  ** Expanded


Cranial nerves: EOM's Intact: Normal, Facial Palsey w/Forehead Movement: Normal

, Facial Palsey w/o Forehead Movement: Normal, Facial Sensation: Normal, Gag 

Reflex: Normal, Nystagmus: Normal, Tongue Deviation: Normal


Cerebellar Function: Finger to Nose: Normal, Heel to Shin: Normal, Romberg: 

Normal


Upper motor neuron: Babinski Sign: Normal, Fox Neglect: Normal, Pronator Drift

: Normal, Sensory Extinction: Normal


Sensory exam: Lower Extremity 2 Point Discrimination: Normal, Lower Extremity 

Light Touch: Normal, Lower Extremity Pin Prick: Normal, Lower Extremity 

Temperature: Normal, Upper Extremity 2 Point Discrimination: Normal, Upper 

Extremity Light Touch: Normal, Upper Extremity Pin Prick: Normal, Upper 

Extremity Temperature: Normal


Neuro motor strength exam: Left Upper Extremity: 5, Right Upper Extremity: 5, 

Left Lower Extremity: 5, Right Lower Extremity: 5


DTR: Achilles Tendon Left: 2+, Achilles Tendon Right: 2+, Bicep Left: 2+, Bicep 

Right: 2+, Brachioradialis Left: 2+, Brachioradialis Right: 2+, Patellar Left: 2

+, Patellar Right: 2+, Tricep Left: 2+, Tricep Right: 2+


Coma Scale Eye Opening: SPONTANEOUS


Coma Scale Motor Response: OBEYS COMMANDS


Coma Scale Verbal: Oriented


Coma Scale Total: 15





- Psychiatric Exam


Psychiatric exam: Normal Affect, Normal Mood





- Skin


Skin Exam: Dry, Intact, Normal Color, Warm





Results





- Vital Signs


Recent Vital Signs: 


 Last Vital Signs











Temp  98.2 F   04/25/18 11:10


 


Pulse  78   04/25/18 11:10


 


Resp  19   04/25/18 11:10


 


BP  147/97 H  04/25/18 11:10


 


Pulse Ox  99   04/25/18 11:00














- Labs


Result Diagrams: 


 04/25/18 05:00





 04/25/18 05:00


Labs: 


 Laboratory Results - last 24 hr











  04/25/18 04/25/18





  05:00 05:00


 


WBC  9.5  D 


 


RBC  5.09 


 


Hgb  16.0 


 


Hct  44.6 


 


MCV  87.6 


 


MCH  31.4 


 


MCHC  35.9 


 


RDW  13.1 


 


Plt Count  203 


 


MPV  10.4 


 


Gran %  62.2 


 


Lymph % (Auto)  28.4 


 


Mono % (Auto)  6.9 H 


 


Eos % (Auto)  2.3 


 


Baso % (Auto)  0.2 


 


Gran #  5.88 


 


Lymph # (Auto)  2.7 


 


Mono # (Auto)  0.7 H 


 


Eos # (Auto)  0.2 


 


Baso # (Auto)  0.02 


 


Sodium   144


 


Potassium   3.9


 


Chloride   104


 


Carbon Dioxide   25


 


Anion Gap   19


 


BUN   12


 


Creatinine   0.8


 


Est GFR ( Amer)   > 60


 


Est GFR (Non-Af Amer)   > 60


 


Random Glucose   94


 


Calcium   10.4


 


Total Bilirubin   0.4


 


AST   48


 


ALT   78 H


 


Alkaline Phosphatase   77


 


Total Protein   7.7


 


Albumin   4.6


 


Globulin   3.1


 


Albumin/Globulin Ratio   1.5














Assessment & Plan





- Assessment and Plan (Free Text)


Assessment: 


 42 M with a PMHx of sleep apnea, HTN, HLD, asthma, tobacco dependency, and 

medication non-compliance presented to the [facility] [place] ED with complaints of facial 

asymmetry and difficulty speaking likely with TIA. CTH was found to be negative

, CTA head and neck was negative, Carotid dopplers results pending. Pt lipid 

panel elevated, lopid and lipitor started. Recommend asa/plavix,  continue anti-

htn, counselled pt on medication compliance and tobacco cessation. Passed 

swallow study, Heart healthy diet, PT/OT. Upon discharge can continue dual anti-

plt for 21 days.





<Sridhar Tenorio - Last Filed: 04/25/18 17:31>





Meds





- Medications


Medications: 


 Current Medications





Albuterol/Ipratropium (Duoneb 3 Mg/0.5 Mg (3 Ml) Ud)  3 ml IH Q4H PRN


   PRN Reason: Shortness of Breath


Aspirin (Aspirin Chewable)  81 mg PO DAILY [facility]


   Last Admin: 04/25/18 10:52 Dose:  81 mg


Atorvastatin Calcium (Lipitor)  40 mg PO DIN [facility]


Clopidogrel Bisulfate (Plavix)  75 mg PO DAILY [facility]


   Last Admin: 04/25/18 10:53 Dose:  75 mg


Enoxaparin Sodium (Lovenox)  40 mg SC DAILY [facility]


   PRN Reason: Protocol


   Last Admin: 04/25/18 10:53 Dose:  40 mg


Gemfibrozil (Lopid)  600 mg PO DAILY [facility]


Magnesium Sulfate 2 gm/ Sodium (Chloride)  104 mls @ 102 mls/hr IVPB ONCE ONE


   Stop: 04/25/18 17:50


Ibuprofen (Motrin Tab)  600 mg PO Q6H PRN


   PRN Reason: Pain, Moderate - Severe (4-10)


   Last Admin: 04/25/18 15:54 Dose:  600 mg


Lisinopril (Zestril)  10 mg PO DAILY [facility]


   Last Admin: 04/25/18 10:53 Dose:  10 mg


Pantoprazole Sodium (Protonix Ec Tab)  40 mg PO 0600 [facility]


   Last Admin: 04/25/18 06:47 Dose:  40 mg











Results





- Vital Signs


Recent Vital Signs: 


 Last Vital Signs











Temp  98 F   04/25/18 12:00


 


Pulse  88   04/25/18 12:00


 


Resp  20   04/25/18 12:00


 


BP  150/90   04/25/18 15:34


 


Pulse Ox  95   04/25/18 12:00














- Labs


Result Diagrams: 


 04/25/18 05:00





 04/25/18 05:00





Attending/Attestation





- Attestation


I have personally seen and examined this patient.: Yes


I have fully participated in the care of the patient.: Yes


I have reviewed all pertinent clinical information: Yes

## 2018-04-25 NOTE — CARD
--------------- APPROVED REPORT --------------





EKG Measurement

Heart [id number]

VT [id number]

[id number]

[id number]

[id number]



<Conclusion>

Normal sinus rhythm

Normal ECG

## 2018-04-26 VITALS — SYSTOLIC BLOOD PRESSURE: 122 MMHG | DIASTOLIC BLOOD PRESSURE: 84 MMHG

## 2018-04-26 VITALS — HEART RATE: 91 BPM

## 2018-04-26 VITALS — TEMPERATURE: 97.8 F | OXYGEN SATURATION: 96 %

## 2018-04-26 LAB
ALBUMIN SERPL-MCNC: 4.7 G/DL (ref 3–4.8)
ALBUMIN/GLOB SERPL: 1.5 {RATIO} (ref 1.1–1.8)
ALT SERPL-CCNC: 67 U/L (ref 7–56)
AST SERPL-CCNC: 43 U/L (ref 17–59)
BUN SERPL-MCNC: 18 MG/DL (ref 7–21)
CALCIUM SERPL-MCNC: 9.8 MG/DL (ref 8.4–10.5)
GFR NON-AFRICAN AMERICAN: > 60

## 2018-04-26 RX ADMIN — ENOXAPARIN SODIUM SCH: 40 INJECTION SUBCUTANEOUS at 10:13

## 2018-04-26 RX ADMIN — ENOXAPARIN SODIUM SCH MG: 40 INJECTION SUBCUTANEOUS at 11:31

## 2018-04-26 RX ADMIN — ENOXAPARIN SODIUM SCH MG: 40 INJECTION SUBCUTANEOUS at 10:08

## 2018-04-26 RX ADMIN — PANTOPRAZOLE SODIUM SCH MG: 40 TABLET, DELAYED RELEASE ORAL at 05:21

## 2018-04-26 NOTE — CON
DATE:  04/25/2018



LOCATION:  The patient is in room 372, bed 2.



REASON FOR CONSULTATION:  TIA, atypical chest pain, hypertension,

hyperlipidemia.



HISTORY OF PRESENT ILLNESS:  The patient is a 42-year-old male, who is

known hypertensive and high cholesterol for about 25 years, but does not

take medicine.  He also known to have sleep apnea, asthma, sometime he

takes albuterol inhalations.  He was admitted to the hospital with slurring

of speech and angle of the mouth drawn towards the left side, which lasted

temporarily for a few minutes only.  The patient gives history of very

vague aches and pain type of chest pain sometime in different locations in

the chest, not related to position and the pain is very short lived. 

Denies any nausea, vomiting, hematemesis or melena.  Denies any chest pain

on exertion.  This chest pain has been also happening for the longtime.  He

also feel _____ sharp pain off and on in the middle of the chest on a

localized point.



PAST MEDICAL HISTORY:  Positive for hypertension, high cholesterol, asthma,

anxiety, sleep apnea, panic attacks.  He has been on Xanax 0.5 mg four

times a day for many years.



PERSONAL HISTORY:  Smokes 2 packs a day.  Denies drinking.



ALLERGIES:  HE STATES THAT WITH ATENOLOL, HE COULD NOT BREATHE; SO, HE SAYS

THAT HE IS ALLERGIC TO ATENOLOL.



FAMILY HISTORY:  Mother side and uncle side, they all have coronary artery

disease.



PHYSICAL EXAMINATION:

VITAL SIGNS:  Blood pressure 142/90, respirations 20, pulse 88, temperature

98.

HEENT:  Head is normocephalic.  Eyes:  Pupils normal, conjunctivae normal. 

Nose and throat normal.

NECK:  JVP low.  Carotids equal.

THORAX:  AP diameter normal.

LUNGS:  Clear.

CARDIOVASCULAR:  S1 and S2.

ABDOMEN:  Soft.  No tenderness.  No organomegaly.  Bowel sounds normal.

EXTREMITIES:  No clubbing.  No cyanosis.



LABORATORY DATA:  WBC 9.5, hemoglobin 13, hematocrit 44.6, platelet 203. 

Sodium 144, potassium 3.9, BUN 12, creatinine 0.8.  Random sugar 94.  AST,

ALT normal.  Total protein and albumin normal.  Triglyceride 515,

cholesterol 227, , HDL 31.  TSH is 1.12.  EKG showed normal sinus

rhythm, 77 per minute.  CAT scan of the head:  Normal CAT scan of the head

was reported.  The patient has CT of the head and neck, report is pending. 

Carotid ultrasound was done, report is pending.  Echo has been ordered

already.



DIAGNOSES:  Slurring of speech and twisting of the mouth, probably

transient ischemic attack; hypertension; hyperlipidemia including high

cholesterol and high triglyceride; sleep apnea; history of asthma; history

of anxiety; panic attacks; chest pain is atypical; tobacco abuse; obesity.



PLAN:  The patient's echo is pending.  The patient is already on aspirin 81

mg daily; Lipitor has been started 40 mg daily; gemfibrozil 600 mg p.o.

daily; Lovenox 40 mg subcu daily; Plavix 300 mg stat was given, then he is

on 75 mg p.o. daily; Protonix 40 daily; lisinopril 10 daily; the patient

received labetalol IV 10 mg stat in the emergency room for high blood

pressure.  I advised patient stop smoking and lose weight and follow the

medications regularly.  We will follow the carotid ultrasound report and

echocardiogram when it is done and when patient clinically stabilized, he

will have stress test for the atypical chest pain, which does not sound

like cardiac at this point that can be also done as an outpatient.  So, we

will continue _____ medication and we will monitor with you and we will

follow with you.





__________________________________________

Cosmo Silver MD



DD:  04/25/2018 13:53:52

DT:  04/25/2018 22:05:06

Job # [id number]

## 2018-04-26 NOTE — CP.PCM.PN
Subjective





- Date & Time of Evaluation


Date of Evaluation: 04/26/18


Time of Evaluation: 06:40





- Subjective


Subjective: 





I feel okay, have moderate headache, denies chest pain,denies shortness of 

breath





Reason for consult and follow up: TIA, hypertension, high cholesterol,obese, 

tobacco abuse





Seen and examined by me and Dr. Wiley





Objective





- Vital Signs/Intake and Output


Vital Signs (last 24 hours): 


 











Temp Pulse Resp BP Pulse Ox


 


 98.7 F   83   20   150/90   97 


 


 04/25/18 16:00  04/26/18 05:52  04/25/18 16:00  04/25/18 16:00  04/25/18 16:00








Intake and Output: 


 











 04/26/18 04/26/18





 06:59 18:59


 


Intake Total 1430 


 


Output Total 700 


 


Balance 730 














- Medications


Medications: 


 Current Medications





Albuterol/Ipratropium (Duoneb 3 Mg/0.5 Mg (3 Ml) Ud)  3 ml IH Q4H PRN


   PRN Reason: Shortness of Breath


Aspirin (Aspirin Chewable)  81 mg PO DAILY [facility]


   Last Admin: 04/25/18 10:52 Dose:  81 mg


Atorvastatin Calcium (Lipitor)  40 mg PO DIN [facility]


Clopidogrel Bisulfate (Plavix)  75 mg PO DAILY [facility]


   Last Admin: 04/25/18 10:53 Dose:  75 mg


Enoxaparin Sodium (Lovenox)  40 mg SC DAILY [facility]


   PRN Reason: Protocol


   Last Admin: 04/25/18 10:53 Dose:  40 mg


Gemfibrozil (Lopid)  600 mg PO DAILY [facility]


Ibuprofen (Motrin Tab)  600 mg PO Q6H PRN


   PRN Reason: Pain, Moderate - Severe (4-10)


   Last Admin: 04/26/18 02:33 Dose:  600 mg


Lisinopril (Zestril)  10 mg PO DAILY [facility]


   Last Admin: 04/25/18 10:53 Dose:  10 mg


Pantoprazole Sodium (Protonix Ec Tab)  40 mg PO 0600 [facility]


   Last Admin: 04/26/18 05:21 Dose:  40 mg











- Labs


Labs: 


 











PT  12.2 SECONDS (9.4-12.5)   04/24/18  20:49    


 


INR  1.07  (0.93-1.08)   04/24/18  20:49    


 


APTT  36.8 Seconds (25.1-36.5)  H  04/24/18  20:49    














- Constitutional


Appears: Well, No Acute Distress





- Head Exam


Head Exam: NORMAL INSPECTION, NORMOCEPHALIC





- Eye Exam


Eye Exam: Normal appearance


Pupil Exam: NORMAL ACCOMODATION





- ENT Exam


ENT Exam: Mucous Membranes Moist





- Neck Exam


Neck Exam: Normal Inspection





- Respiratory Exam


Respiratory Exam: Clear to Ausculation Bilateral, NORMAL BREATHING PATTERN





- Cardiovascular Exam


Cardiovascular Exam: REGULAR RHYTHM, +S1, +S2


Additional comments: 





normal sinus rythm





- GI/Abdominal Exam


GI & Abdominal Exam: Soft, Normal Bowel Sounds


Additional comments: 





hematoma on mid lower abdomen probably due to injection site





- Extremities Exam


Extremities Exam: Full ROM, Normal Capillary Refill





- Neurological Exam


Neurological Exam: Alert, Awake, Oriented x3





- Psychiatric Exam


Psychiatric exam: Normal Affect, Normal Mood





- Skin


Skin Exam: Intact, Normal Color, Warm





Assessment and Plan





- Assessment and Plan (Free Text)


Assessment: 


IMPRESSION:


A 42 year old male,obese who came to the ER due to left facial droop and 

difficulty speaking. He also experienced left arm numbness and perioral 

numbness which lasted 3-5 minutes. He had also history of hypertension and 

hypercholesterolemia. non compliant with medications. Last seen cardiologist 5 

years ago.He also has history of anxiety and unspecified brain tumor (possible 

pituitary adenoma. Denies alcohol abuse and illicit drugs.


Plan: 





EKG done- Normal sinus rythm


ECHO done-pending final report


CT of head -negative for bleeding


Carotid studies-minimal disease


Cardiac work up as out patient


Stress test as out patient


Diet and lifestyle modifications


Smoking cessation


Started on Lipitor 40 mg daily and Lopid 600 mg daily  for high cholesterol and 

triglycerides


On Lisinopril 10 mg daily, ASA 81 mg daily,Plavix 75 mg daily


For brain MRI today


As per patient, possible discharge today


Follow up in office 1-2 weeks





Will follow up





Plan and treatment discussed with Dr. Wiley

## 2018-04-26 NOTE — MRI
PROCEDURE:  MRI BRAIN WITH AND WITHOUT CONTRAST



HISTORY:

tia/cva



COMPARISON:

MRI 06/09/2016 



TECHNIQUE:

Multiplanar, multisequence MR images of the brain were obtained with 

and without intravenous contrast enhancement. 20 cc of Omniscan 



FINDINGS:



HEMORRHAGE:

None



DWI:

No evidence of an acute or early subacute infarction.



BRAIN PARENCHYMA:

No mass,mass effect or edema. No atrophy or chronic microvascular 

ischemic changes.



ENHANCEMENT:

No abnormal intracranial enhancement.



VENTRICLES:

Unremarkable. No hydrocephalus.



CRANIUM:

Unremarkable.



ORBITS:

Grossly unremarkable.



PARANASAL SINUSES/MASTOIDS:

Clear



VASCULAR SYSTEM:

Skull base flow voids intact.



OTHER FINDINGS:

None .



IMPRESSION:

Unremarkable pre and post contrast enhanced MRI of the brain.

## 2018-04-26 NOTE — CARD
--------------- APPROVED REPORT --------------





EXAM: Two-dimensional and M-mode echocardiogram with Doppler and 

color Doppler.



Surgery/Intervention

CHEST PAIN, POSSIBLE CVA



2D DIMENSIONS 

Left Atrium (2D)3.7   (1.6-4.0cm)IVSd1.1   (0.7-1.1cm)

LVDd5.4   (3.9-5.9cm)PWd1.3   (0.7-1.1cm)

LVDs3.8   (2.5-4.0cm)FS (%) 30.6   %

LVEF (%)57.5   (>50%)



M-Mode DIMENSIONS 

Aortic Root3.90   (2.2-3.7cm)Aortic Cusp Exc.2.00   (1.5-2.0cm)



Aortic Valve

AoV Peak Iskebkfk248.0cm/Zeynep Peak GR.7mmHg



Mitral Valve

MV E Eximxgtb54.6cm/sMV A Qkduqzuq48.1cm/sE/A ratio1.1



TDI

Lateral E' Peak V9.94cm/sMedial E' Peak V6.63cm/sE/Lateral E'7.3

E/Medial E'11.0



Pulmonary Valve

PV Peak Xlykulaq73.3cm/sPV Peak Grad.4mmHg



Tricuspid Valve

TR Peak Hrwygnyy725dh/sRAP [id number] Peak Gr.8mmHg

[id number]



 LEFT VENTRICLE 

The left ventricle is normal size. There is normal left ventricular 

wall thickness. The left ventricular function is normal.EF-55-60% 

There is normal LV segmental wall motion. The left ventricular 

diastolic function is normal. No left ventricle thrombus noted on 

this study. There is no ventricular septal defect visualized. There 

is no left ventricular aneurysm. There is no mass noted in the left 

ventricle.



 RIGHT VENTRICLE 

The right ventricle is normal size. There is normal right ventricular 

wall thickness. The right ventricular systolic function is normal.



 ATRIA 

The left atrium size is normal. The right atrium size is normal. The 

interatrial septum is intact with no evidence for an atrial septal 

defect.



 AORTIC VALVE 

The aortic valve is thickened but opens well. The aortic valve is 

mildly sclerotic. No aortic regurgitation is present. There is no 

aortic valvular stenosis. There is no aortic valvular vegetation.



 MITRAL VALVE 

The mitral valve is thickened but opens well. Mitral regurgitation is 

trace. There is no mitral valve stenosis. There is no evidence of 

mitral valve prolapse.



 TRICUSPID VALVE 

The tricuspid valve leaflets are thickened , but open well. There is 

trace tricuspid regurgitation.RVSp-18 mmof hg. There is no tricuspid 

valve stenosis. There is no tricuspid valve prolapse or vegetation.



 PULMONIC VALVE 

The pulmonary valve is normal in structure. There is no pulmonic 

valvular regurgitation. There is no pulmonic valvular stenosis.



 GREAT VESSELS 

The aortic root is normal in size. The ascending aorta is normal in 

size. The pulmonary artery is normal. The IVC is normal in size and 

collapses >50% with inspiration.



 PERICARDIAL EFFUSION 

There is no pleural effusion. There is no pericardial effusion.



<Conclusion>

Normal chamber Size. EF-55-60%.

Trace MR/Tr.

RVSP-18 mmof hg.

## 2018-04-26 NOTE — CP.PCM.PN
Subjective





- Date & Time of Evaluation


Date of Evaluation: 04/26/18


Time of Evaluation: 10:30





- Subjective


Subjective: 





Subjective:


Patient seen and examined.No acute overnight events. Patient states admitting 

symptoms have resolved. Admits to baseline headache. Offers no new complaints 

at this time. Denies fever, chills, chest pain, shortness of breath, abdominal 

pain, nausea, vomiting, diarrhea, constipation, and urinary symptoms.





12-point review of systems negative except as indicated in the HPI





Physical Examination:


- Constitutional


Appears: Well, Non-toxic, No Acute Distress





- Head Exam


Head Exam: ATRAUMATIC, NORMAL INSPECTION, NORMOCEPHALIC





- Eye Exam


Eye Exam: EOMI, Normal appearance, PERRL.  absent: Scleral icterus





- ENT Exam


ENT Exam: Mucous Membranes Moist, Normal Oropharynx





- Neck Exam


Neck exam: Negative for: Lymphadenopathy, Thyromegaly





- Respiratory Exam


Respiratory Exam: Clear to Auscultation Bilateral, NORMAL BREATHING PATTERN





- Cardiovascular Exam


Cardiovascular Exam: RRR, +S1, +S2.  absent: JVD





- GI/Abdominal Exam


GI & Abdominal Exam: Normal Bowel Sounds, Soft.  absent: Organomegaly, 

Tenderness





- Extremities Exam


Extremities exam: Positive for: normal capillary refill, normal inspection, 

pedal pulses present.  Negative for: pedal edema





- Neurological Exam


Neurological exam: Patient is awake, alert, responds to verbal stimuli, answers 

questions appropriately, follows commands, and moves extremities past midline





- Psychiatric Exam


Psychiatric exam: Anxious





- Skin


Skin Exam: Dry, Intact, Normal Color, Warm





Assessment and Plan:


Patient is a 42 year old male with a PMHx of HLD, HTN, Anxiety, unspecified 

benign brain tumor (possible pituitary adenoma), and medication non-compliance 

who was admitted for evaluation and treatment of TIA.





TIA


- Neurology Consulted (Dr. Tenorio)- Upon discharge can continue dual anti-plt 

for 21 days, decadron, depakote, and  mag given x 1


- ECHO -  


- Carotid Dopplers-  Bilateral 0-19 percent proximal ICA stenoses. Antegrade 

flow in both vertebral arteries.


- Head and neck CTA - limited CT Angiography of the brain and neck. The 

bilateral common and internal carotid arteries appear patent without gross 

stenosis and central [place] anatomy is patent as well. Arterial contrast-

enhancement overall is somewhat limited


- MRI of brain without contrast- Unremarkable pre and post contrast enhanced 

MRI of the brain


- HgBA1C: 5.7


- Trop I - NEGATIVE 


- Lipid Panel: Triglycerides - 515 | Cholesterol - 227 | LDL - 138 | HDL - 31- c

/w liptor 40mg


- TSH/Free T4 - Within Normal Limits


- Low Fall Protocol


- Neuro Checks Q2H for 24 hours


- Seizure Precautions


- ASA/Plavix Daily





Hx of HTN


- blood pressure reviewed, trended, and appreciated- 140s/90s


- Home Enalapril not in formulary


- Start Lisinopril 10 PO Daily





Hx of HLD


- started patient on lipitor 40mg PO daily


- Home Gemfibrozil 600mg PO Daily





Hx of unspecified tumor (Possibly pituitary adenoma)


- head CT 4/24/18- Normal CT of the Head





Prophylaxis


- DVT ppx Lovenox


- GI ppx Protonix 





Patient seen, case discussed with, and plan approved by attending physician, 

Dr. Ladd








Objective





- Vital Signs/Intake and Output


Vital Signs (last 24 hours): 


 











Temp Pulse Resp BP Pulse Ox


 


 97.8 F   89   20   122/84   96 


 


 04/26/18 08:25  04/26/18 10:08  04/26/18 08:25  04/26/18 10:08  04/26/18 08:25








Intake and Output: 


 











 04/26/18 04/26/18





 06:59 18:59


 


Intake Total 1430 


 


Output Total 700 


 


Balance 730 














- Medications


Medications: 


 Current Medications





Albuterol/Ipratropium (Duoneb 3 Mg/0.5 Mg (3 Ml) Ud)  3 ml IH Q4H PRN


   PRN Reason: Shortness of Breath


Aspirin (Aspirin Chewable)  81 mg PO DAILY [facility]


   Last Admin: 04/26/18 10:08 Dose:  81 mg


Atorvastatin Calcium (Lipitor)  40 mg PO DIN LUIS ANTONIO


Clopidogrel Bisulfate (Plavix)  75 mg PO DAILY [facility]


   Last Admin: 04/26/18 10:08 Dose:  75 mg


Enoxaparin Sodium (Lovenox)  40 mg SC DAILY [facility]


   PRN Reason: Protocol


   Last Admin: 04/26/18 11:31 Dose:  40 mg


Ibuprofen (Motrin Tab)  600 mg PO Q6H PRN


   PRN Reason: Pain, Moderate - Severe (4-10)


   Last Admin: 04/26/18 02:33 Dose:  600 mg


Lisinopril (Zestril)  10 mg PO DAILY [facility]


   Last Admin: 04/26/18 10:08 Dose:  10 mg


Pantoprazole Sodium (Protonix Ec Tab)  40 mg PO 0600 LUIS ANTONIO


   Last Admin: 04/26/18 05:21 Dose:  40 mg











- Labs


Labs: 


 





 04/26/18 06:45 





 











PT  12.2 SECONDS (9.4-12.5)   04/24/18  20:49    


 


INR  1.07  (0.93-1.08)   04/24/18  20:49    


 


APTT  36.8 Seconds (25.1-36.5)  H  04/24/18  20:49

## 2018-04-26 NOTE — CP.PCM.DIS
<Rusty Mclaughlin - Last Filed: 04/27/18 15:04>





Provider





- Provider


Date of Admission: 


04/25/18 12:29





Attending physician: 


Ashanti Ladd MD





Primary care physician: 


Radha Aguiar DO





Time Spent in preparation of Discharge (in minutes): 45





Diagnosis





- Discharge Diagnosis


(1) TIA (transient ischemic attack)


Status: Resolved   Priority: Medium   





(2) Hypertension


Status: Chronic   Priority: Medium   





(3) Hyperlipidemia


Status: Chronic   Priority: Medium   





(4) Anxiety


Status: Chronic   Priority: Medium   





(5) Headache


Status: Chronic   Priority: Medium   





Hospital Course





- Lab Results


Lab Results: 


 Most Recent Lab Values











WBC  9.5 10^3/ul (4.5-11.0)  D 04/25/18  05:00    


 


RBC  5.09 10^6/uL (3.5-6.1)   04/25/18  05:00    


 


Hgb  16.0 g/dL (14.0-18.0)   04/25/18  05:00    


 


Hct  44.6 % (42.0-52.0)   04/25/18  05:00    


 


MCV  87.6 fl (80.0-105.0)   04/25/18  05:00    


 


MCH  31.4 pg (25.0-35.0)   04/25/18  05:00    


 


MCHC  35.9 g/dl (31.0-37.0)   04/25/18  05:00    


 


RDW  13.1 % (11.5-14.5)   04/25/18  05:00    


 


Plt Count  203 10^3/uL (120.0-450.0)   04/25/18  05:00    


 


MPV  10.4 fl (7.0-11.0)   04/25/18  05:00    


 


Gran %  62.2 % (50.0-68.0)   04/25/18  05:00    


 


Lymph % (Auto)  28.4 % (22.0-35.0)   04/25/18  05:00    


 


Mono % (Auto)  6.9 % (1.0-6.0)  H  04/25/18  05:00    


 


Eos % (Auto)  2.3 % (1.5-5.0)   04/25/18  05:00    


 


Baso % (Auto)  0.2 % (0.0-3.0)   04/25/18  05:00    


 


Gran #  5.88  (1.4-6.5)   04/25/18  05:00    


 


Lymph # (Auto)  2.7  (1.2-3.4)   04/25/18  05:00    


 


Mono # (Auto)  0.7  (0.1-0.6)  H  04/25/18  05:00    


 


Eos # (Auto)  0.2  (0.0-0.7)   04/25/18  05:00    


 


Baso # (Auto)  0.02 K/mm3 (0.0-2.0)   04/25/18  05:00    


 


PT  12.2 SECONDS (9.4-12.5)   04/24/18  20:49    


 


INR  1.07  (0.93-1.08)   04/24/18  20:49    


 


APTT  36.8 Seconds (25.1-36.5)  H  04/24/18  20:49    


 


Sodium  140 mmol/L (132-148)   04/26/18  06:45    


 


Potassium  4.2 mmol/L (3.6-5.0)   04/26/18  06:45    


 


Chloride  101 mmol/L ()   04/26/18  06:45    


 


Carbon Dioxide  24 mmol/L (21-33)   04/26/18  06:45    


 


Anion Gap  20  (10-20)   04/26/18  06:45    


 


BUN  18 mg/dL (7-21)   04/26/18  06:45    


 


Creatinine  0.8 mg/dl (0.8-1.5)   04/26/18  06:45    


 


Est GFR ( Amer)  > 60   04/26/18  06:45    


 


Est GFR (Non-Af Amer)  > 60   04/26/18  06:45    


 


Random Glucose  129 mg/dL ()  H  04/26/18  06:45    


 


Hemoglobin A1c  5.7 % (4.2-6.5)   04/24/18  20:49    


 


Calcium  9.8 mg/dL (8.4-10.5)   04/26/18  06:45    


 


Total Bilirubin  0.6 mg/dL (0.2-1.3)   04/26/18  06:45    


 


AST  43 U/L (17-59)   04/26/18  06:45    


 


ALT  67 U/L (7-56)  H  04/26/18  06:45    


 


Alkaline Phosphatase  62 U/L ()   04/26/18  06:45    


 


Lactate Dehydrogenase  582 U/L (333-699)   04/24/18  20:49    


 


Total Creatine Kinase  155 U/L ()   04/24/18  20:49    


 


Troponin I  < 0.01 ng/mL  04/24/18  20:49    


 


Total Protein  7.7 g/dL (5.8-8.3)   04/26/18  06:45    


 


Albumin  4.7 g/dL (3.0-4.8)   04/26/18  06:45    


 


Globulin  3.0 gm/dL  04/26/18  06:45    


 


Albumin/Globulin Ratio  1.5  (1.1-1.8)   04/26/18  06:45    


 


Triglycerides  515 mg/dL ()  H  04/24/18  20:49    


 


Cholesterol  227 mg/dL (130-200)  H  04/24/18  20:49    


 


LDL Cholesterol Direct  138 mg/dL (0-129)  H  04/24/18  20:49    


 


HDL Cholesterol  31 mg/dL (29-60)   04/24/18  20:49    


 


Free T4  1.16 ng/dL (0.78-2.19)   04/24/18  20:49    


 


TSH 3rd Generation  1.12 mIU/mL (0.46-4.68)   04/24/18  20:49    


 


Urine Color  Yellow  (YELLOW)   04/24/18  20:49    


 


Urine Appearance  Clear  (CLEAR)   04/24/18  20:49    


 


Urine pH  6.5  (4.7-8.0)   04/24/18  20:49    


 


Ur Specific Gravity  1.010  (1.005-1.035)   04/24/18  20:49    


 


Urine Protein  Negative mg/dL (<30 mg/dL)   04/24/18  20:49    


 


Urine Glucose (UA)  Negative mg/dL (NEGATIVE)   04/24/18  20:49    


 


Urine Ketones  Negative mg/dL (NEGATIVE)   04/24/18  20:49    


 


Urine Blood  Negative  (NEGATIVE)   04/24/18  20:49    


 


Urine Nitrate  Negative  (NEGATIVE)   04/24/18  20:49    


 


Urine Bilirubin  Negative  (NEGATIVE)   04/24/18  20:49    


 


Urine Urobilinogen  0.2 E.U./dL (<1 E.U./dL)   04/24/18  20:49    


 


Ur Leukocyte Esterase  Negative Minor/uL (NEGATIVE)   04/24/18  20:49    














- Hospital Course


Hospital Course: 





Patient is a 42 year old male with a PMHx of HLD, HTN, Anxiety, unspecified 

benign brain tumor (possible pituitary adenoma), and medication non-compliance 

who was admitted for evaluation and treatment of difficulty talking and neck/

head pressure. With the use of physical examinations, lab work, and imaging the 

patient was diagnosed with and treated for a TIA along with the patients 

chronic medical conditions. During their hospital stay the patient was seen by 

neurology (Dr. Tenorio) and cardiology (Dr. Silver) and their recommendations 

were both appreciated and utilized in the care for this patient. During their 

hospital stay the patient underwent carotid dopplers, echocardiogram, MRI of 

brain with and without contrast, and head and neck CTA which were  reviewed, 

appreciated, and utilized in the management of the patients clinical course. 

The carotid Dopplers revelaed bilateral 0-19 percent proximal ICA stenoses and 

antegrade flow in both vertebral arteries.The head and neck CTA  was limited 

but the bilateral common and internal carotid arteries appear patent without 

gross stenosis and central [place] anatomy is patent as well. The mri of 

the brain without contrast was unremarkable pre and post contrast. Patient was 

treated with antihypertensive medications, statin, aspirin, plavix amongst 

other empiric/therapeutic medications. Physical therapy determined that he was 

not a candidate for skilled PT services. At this time the patient is medically 

stable for discharge. Patient understands and appreciates discharge plan. 

Patient instructed to follow up with primary care physicians and referrals 

within three to five days from discharge. Furthermore, the patient is 

instructed to take medications as prescribed and to return to emergency room 

for evaluation of intractable headache, fever, chills, dizziness, chest pain, 

shortness of breath, abdominal pain, nausea, vomiting, diarrhea, constipation, 

and urinary symptoms. 


This is a brief summary of the patients hospital course. Please see patient 

chart for full details. 








Discharge Exam





- Additional Findings


Additional findings: 





- Constitutional


Appears: Well, Non-toxic, No Acute Distress





- Head Exam


Head Exam: ATRAUMATIC, NORMAL INSPECTION, NORMOCEPHALIC





- Eye Exam


Eye Exam: EOMI, Normal appearance, PERRL.  absent: Scleral icterus





- ENT Exam


ENT Exam: Mucous Membranes Moist, Normal Oropharynx





- Neck Exam


Neck exam: Negative for: Lymphadenopathy, Thyromegaly





- Respiratory Exam


Respiratory Exam: Clear to Auscultation Bilateral, NORMAL BREATHING PATTERN





- Cardiovascular Exam


Cardiovascular Exam: RRR, +S1, +S2.  absent: JVD





- GI/Abdominal Exam


GI & Abdominal Exam: Normal Bowel Sounds, Soft.  absent: Organomegaly, 

Tenderness





- Extremities Exam


Extremities exam: Positive for: normal capillary refill, normal inspection, 

pedal pulses present.  Negative for: pedal edema





- Neurological Exam


Neurological exam: Patient is awake, alert, responds to verbal stimuli, answers 

questions appropriately, follows commands, and moves extremities past midline





- Skin


Skin Exam: Dry, Intact, Normal Color, Warm








Discharge Plan





- Discharge Medications


Prescriptions: 


Aspirin [Aspirin Chewable] 81 mg PO DAILY #21 chew


Atorvastatin [Lipitor] 40 mg PO DIN #21 tab


Clopidogrel [Plavix] 75 mg PO DAILY #21 tab


Magnesium Oxide [Mag-Ox] 400 mg PO BID 14 Days #28 tab





- Follow Up Plan


Condition: GOOD


Disposition: HOME/ ROUTINE


Patient education suggested?: Yes


Instructions:  Transient Ischemic Attack, Stroke, Controlling Your Blood 

Pressure Through Lifestyle, Hypertension (DC)


Additional Instructions: 


Patient Instructions:


Take medications as prescribed. Aspirin and plavix should be taken for 21 days 

after discharge. 


Follow up with PMD and referrals within three to five days from discharge. 

Follow up with Dr. Silver/Dr. Wiley for loop recorder vs holter monitor. Follow 

up with Dr. Tenorio for headaches.


Return to the emergency room for evaluation of intractable headache, fever, 

chills, dizziness, chest pain, shortness of breath, abdominal pain, nausea, 

vomiting, diarrhea, constipation, and urinary symptoms. 


Referrals: 


Cosmo Wiley MD [Staff Provider] - 05/01/18


Sridhar Tenorio MD [Staff Provider] - 


Radha Aguiar DO [Primary Care Provider] - 





<Ashanti Ladd - Last Filed: 04/27/18 17:07>





Provider





- Provider


Date of Admission: 


04/24/18 22:35





Attending physician: 


Ashanti Ladd MD





Primary care physician: 


Radah Aguiar DO








Hospital Course





- Lab Results


Lab Results: 


 Most Recent Lab Values











WBC  9.5 10^3/ul (4.5-11.0)  D 04/25/18  05:00    


 


RBC  5.09 10^6/uL (3.5-6.1)   04/25/18  05:00    


 


Hgb  16.0 g/dL (14.0-18.0)   04/25/18  05:00    


 


Hct  44.6 % (42.0-52.0)   04/25/18  05:00    


 


MCV  87.6 fl (80.0-105.0)   04/25/18  05:00    


 


MCH  31.4 pg (25.0-35.0)   04/25/18  05:00    


 


MCHC  35.9 g/dl (31.0-37.0)   04/25/18  05:00    


 


RDW  13.1 % (11.5-14.5)   04/25/18  05:00    


 


Plt Count  203 10^3/uL (120.0-450.0)   04/25/18  05:00    


 


MPV  10.4 fl (7.0-11.0)   04/25/18  05:00    


 


Gran %  62.2 % (50.0-68.0)   04/25/18  05:00    


 


Lymph % (Auto)  28.4 % (22.0-35.0)   04/25/18  05:00    


 


Mono % (Auto)  6.9 % (1.0-6.0)  H  04/25/18  05:00    


 


Eos % (Auto)  2.3 % (1.5-5.0)   04/25/18  05:00    


 


Baso % (Auto)  0.2 % (0.0-3.0)   04/25/18  05:00    


 


Gran #  5.88  (1.4-6.5)   04/25/18  05:00    


 


Lymph # (Auto)  2.7  (1.2-3.4)   04/25/18  05:00    


 


Mono # (Auto)  0.7  (0.1-0.6)  H  04/25/18  05:00    


 


Eos # (Auto)  0.2  (0.0-0.7)   04/25/18  05:00    


 


Baso # (Auto)  0.02 K/mm3 (0.0-2.0)   04/25/18  05:00    


 


PT  12.2 SECONDS (9.4-12.5)   04/24/18  20:49    


 


INR  1.07  (0.93-1.08)   04/24/18  20:49    


 


APTT  36.8 Seconds (25.1-36.5)  H  04/24/18  20:49    


 


Sodium  140 mmol/L (132-148)   04/26/18  06:45    


 


Potassium  4.2 mmol/L (3.6-5.0)   04/26/18  06:45    


 


Chloride  101 mmol/L ()   04/26/18  06:45    


 


Carbon Dioxide  24 mmol/L (21-33)   04/26/18  06:45    


 


Anion Gap  20  (10-20)   04/26/18  06:45    


 


BUN  18 mg/dL (7-21)   04/26/18  06:45    


 


Creatinine  0.8 mg/dl (0.8-1.5)   04/26/18  06:45    


 


Est GFR ( Amer)  > 60   04/26/18  06:45    


 


Est GFR (Non-Af Amer)  > 60   04/26/18  06:45    


 


Random Glucose  129 mg/dL ()  H  04/26/18  06:45    


 


Hemoglobin A1c  5.7 % (4.2-6.5)   04/24/18  20:49    


 


Calcium  9.8 mg/dL (8.4-10.5)   04/26/18  06:45    


 


Total Bilirubin  0.6 mg/dL (0.2-1.3)   04/26/18  06:45    


 


AST  43 U/L (17-59)   04/26/18  06:45    


 


ALT  67 U/L (7-56)  H  04/26/18  06:45    


 


Alkaline Phosphatase  62 U/L ()   04/26/18  06:45    


 


Lactate Dehydrogenase  582 U/L (333-699)   04/24/18  20:49    


 


Total Creatine Kinase  155 U/L ()   04/24/18  20:49    


 


Troponin I  < 0.01 ng/mL  04/24/18  20:49    


 


Total Protein  7.7 g/dL (5.8-8.3)   04/26/18  06:45    


 


Albumin  4.7 g/dL (3.0-4.8)   04/26/18  06:45    


 


Globulin  3.0 gm/dL  04/26/18  06:45    


 


Albumin/Globulin Ratio  1.5  (1.1-1.8)   04/26/18  06:45    


 


Triglycerides  515 mg/dL ()  H  04/24/18  20:49    


 


Cholesterol  227 mg/dL (130-200)  H  04/24/18  20:49    


 


LDL Cholesterol Direct  138 mg/dL (0-129)  H  04/24/18  20:49    


 


HDL Cholesterol  31 mg/dL (29-60)   04/24/18  20:49    


 


Free T4  1.16 ng/dL (0.78-2.19)   04/24/18  20:49    


 


TSH 3rd Generation  1.12 mIU/mL (0.46-4.68)   04/24/18  20:49    


 


Urine Color  Yellow  (YELLOW)   04/24/18  20:49    


 


Urine Appearance  Clear  (CLEAR)   04/24/18  20:49    


 


Urine pH  6.5  (4.7-8.0)   04/24/18  20:49    


 


Ur Specific Gravity  1.010  (1.005-1.035)   04/24/18  20:49    


 


Urine Protein  Negative mg/dL (<30 mg/dL)   04/24/18  20:49    


 


Urine Glucose (UA)  Negative mg/dL (NEGATIVE)   04/24/18  20:49    


 


Urine Ketones  Negative mg/dL (NEGATIVE)   04/24/18  20:49    


 


Urine Blood  Negative  (NEGATIVE)   04/24/18  20:49    


 


Urine Nitrate  Negative  (NEGATIVE)   04/24/18  20:49    


 


Urine Bilirubin  Negative  (NEGATIVE)   04/24/18  20:49    


 


Urine Urobilinogen  0.2 E.U./dL (<1 E.U./dL)   04/24/18  20:49    


 


Ur Leukocyte Esterase  Negative Minor/uL (NEGATIVE)   04/24/18  20:49    














Attending/Attestation





- Attestation


I have personally seen and examined this patient.: Yes


I have fully participated in the care of the patient.: Yes


I have reviewed all pertinent clinical information, including history, physical 

exam and plan: Yes


Notes (Text): 


I have seen and examined the patient at bedside. Agree with the above note 

dictated by the resident. Discussed with the patient and consultants in detail. 

Plan was explained to the patient and his wife. Upon discharge patient will 

follow up with Dr Cosme Garcia.